# Patient Record
Sex: FEMALE | Race: WHITE | Employment: OTHER | URBAN - METROPOLITAN AREA
[De-identification: names, ages, dates, MRNs, and addresses within clinical notes are randomized per-mention and may not be internally consistent; named-entity substitution may affect disease eponyms.]

---

## 2017-02-28 ENCOUNTER — ANESTHESIA (OUTPATIENT)
Dept: ENDOSCOPY | Age: 72
End: 2017-02-28
Payer: MEDICARE

## 2017-02-28 ENCOUNTER — SURGERY (OUTPATIENT)
Age: 72
End: 2017-02-28

## 2017-02-28 ENCOUNTER — HOSPITAL ENCOUNTER (OUTPATIENT)
Age: 72
Setting detail: OUTPATIENT SURGERY
Discharge: HOME | End: 2017-02-28
Attending: INTERNAL MEDICINE | Admitting: INTERNAL MEDICINE
Payer: MEDICARE

## 2017-02-28 ENCOUNTER — APPOINTMENT (OUTPATIENT)
Dept: ULTRASOUND IMAGING | Age: 72
End: 2017-02-28
Attending: INTERNAL MEDICINE
Payer: MEDICARE

## 2017-02-28 ENCOUNTER — ANESTHESIA EVENT (OUTPATIENT)
Dept: ENDOSCOPY | Age: 72
End: 2017-02-28
Payer: MEDICARE

## 2017-02-28 VITALS
OXYGEN SATURATION: 99 % | WEIGHT: 184.56 LBS | BODY MASS INDEX: 28.97 KG/M2 | TEMPERATURE: 97.7 F | SYSTOLIC BLOOD PRESSURE: 106 MMHG | DIASTOLIC BLOOD PRESSURE: 57 MMHG | RESPIRATION RATE: 13 BRPM | HEART RATE: 58 BPM | HEIGHT: 67 IN

## 2017-02-28 PROCEDURE — 76060000031 HC ANESTHESIA FIRST 0.5 HR: Performed by: INTERNAL MEDICINE

## 2017-02-28 PROCEDURE — 74011000250 HC RX REV CODE- 250

## 2017-02-28 PROCEDURE — 74011250636 HC RX REV CODE- 250/636: Performed by: INTERNAL MEDICINE

## 2017-02-28 PROCEDURE — 76040000019: Performed by: INTERNAL MEDICINE

## 2017-02-28 PROCEDURE — 74011250636 HC RX REV CODE- 250/636

## 2017-02-28 RX ORDER — SODIUM CHLORIDE 9 MG/ML
INJECTION, SOLUTION INTRAVENOUS
Status: DISCONTINUED | OUTPATIENT
Start: 2017-02-28 | End: 2017-02-28 | Stop reason: HOSPADM

## 2017-02-28 RX ORDER — MIDAZOLAM HYDROCHLORIDE 1 MG/ML
.25-1 INJECTION, SOLUTION INTRAMUSCULAR; INTRAVENOUS
Status: DISCONTINUED | OUTPATIENT
Start: 2017-02-28 | End: 2017-02-28 | Stop reason: HOSPADM

## 2017-02-28 RX ORDER — LIDOCAINE HYDROCHLORIDE 20 MG/ML
INJECTION, SOLUTION EPIDURAL; INFILTRATION; INTRACAUDAL; PERINEURAL AS NEEDED
Status: DISCONTINUED | OUTPATIENT
Start: 2017-02-28 | End: 2017-02-28 | Stop reason: HOSPADM

## 2017-02-28 RX ORDER — ATROPINE SULFATE 0.1 MG/ML
0.5 INJECTION INTRAVENOUS
Status: DISCONTINUED | OUTPATIENT
Start: 2017-02-28 | End: 2017-02-28 | Stop reason: HOSPADM

## 2017-02-28 RX ORDER — DEXTROMETHORPHAN/PSEUDOEPHED 2.5-7.5/.8
1.2 DROPS ORAL
Status: DISCONTINUED | OUTPATIENT
Start: 2017-02-28 | End: 2017-02-28 | Stop reason: HOSPADM

## 2017-02-28 RX ORDER — HYOSCYAMINE SULFATE 0.125 MG
125 TABLET ORAL
Qty: 30 TAB | Refills: 1 | Status: SHIPPED | OUTPATIENT
Start: 2017-02-28

## 2017-02-28 RX ORDER — EPINEPHRINE 0.1 MG/ML
1 INJECTION INTRACARDIAC; INTRAVENOUS
Status: DISCONTINUED | OUTPATIENT
Start: 2017-02-28 | End: 2017-02-28 | Stop reason: HOSPADM

## 2017-02-28 RX ORDER — FENTANYL CITRATE 50 UG/ML
100 INJECTION, SOLUTION INTRAMUSCULAR; INTRAVENOUS
Status: DISCONTINUED | OUTPATIENT
Start: 2017-02-28 | End: 2017-02-28 | Stop reason: HOSPADM

## 2017-02-28 RX ORDER — NALOXONE HYDROCHLORIDE 0.4 MG/ML
0.4 INJECTION, SOLUTION INTRAMUSCULAR; INTRAVENOUS; SUBCUTANEOUS
Status: DISCONTINUED | OUTPATIENT
Start: 2017-02-28 | End: 2017-02-28 | Stop reason: HOSPADM

## 2017-02-28 RX ORDER — SODIUM CHLORIDE 0.9 % (FLUSH) 0.9 %
5-10 SYRINGE (ML) INJECTION EVERY 8 HOURS
Status: DISCONTINUED | OUTPATIENT
Start: 2017-02-28 | End: 2017-02-28 | Stop reason: HOSPADM

## 2017-02-28 RX ORDER — DIPHENHYDRAMINE HYDROCHLORIDE 50 MG/ML
50 INJECTION, SOLUTION INTRAMUSCULAR; INTRAVENOUS ONCE
Status: DISCONTINUED | OUTPATIENT
Start: 2017-02-28 | End: 2017-02-28 | Stop reason: HOSPADM

## 2017-02-28 RX ORDER — SODIUM CHLORIDE 0.9 % (FLUSH) 0.9 %
5-10 SYRINGE (ML) INJECTION AS NEEDED
Status: DISCONTINUED | OUTPATIENT
Start: 2017-02-28 | End: 2017-02-28 | Stop reason: HOSPADM

## 2017-02-28 RX ORDER — PROPOFOL 10 MG/ML
INJECTION, EMULSION INTRAVENOUS AS NEEDED
Status: DISCONTINUED | OUTPATIENT
Start: 2017-02-28 | End: 2017-02-28 | Stop reason: HOSPADM

## 2017-02-28 RX ORDER — FLUMAZENIL 0.1 MG/ML
0.2 INJECTION INTRAVENOUS
Status: DISCONTINUED | OUTPATIENT
Start: 2017-02-28 | End: 2017-02-28 | Stop reason: HOSPADM

## 2017-02-28 RX ORDER — SODIUM CHLORIDE 9 MG/ML
100 INJECTION, SOLUTION INTRAVENOUS CONTINUOUS
Status: DISCONTINUED | OUTPATIENT
Start: 2017-02-28 | End: 2017-02-28 | Stop reason: HOSPADM

## 2017-02-28 RX ORDER — ERGOCALCIFEROL 1.25 MG/1
50000 CAPSULE ORAL
COMMUNITY

## 2017-02-28 RX ADMIN — PROPOFOL 50 MG: 10 INJECTION, EMULSION INTRAVENOUS at 14:51

## 2017-02-28 RX ADMIN — PROPOFOL 50 MG: 10 INJECTION, EMULSION INTRAVENOUS at 14:43

## 2017-02-28 RX ADMIN — LIDOCAINE HYDROCHLORIDE 60 MG: 20 INJECTION, SOLUTION EPIDURAL; INFILTRATION; INTRACAUDAL; PERINEURAL at 14:42

## 2017-02-28 RX ADMIN — PROPOFOL 50 MG: 10 INJECTION, EMULSION INTRAVENOUS at 14:46

## 2017-02-28 RX ADMIN — PROPOFOL 50 MG: 10 INJECTION, EMULSION INTRAVENOUS at 14:55

## 2017-02-28 RX ADMIN — SODIUM CHLORIDE: 9 INJECTION, SOLUTION INTRAVENOUS at 14:36

## 2017-02-28 RX ADMIN — PROPOFOL 50 MG: 10 INJECTION, EMULSION INTRAVENOUS at 15:03

## 2017-02-28 RX ADMIN — PROPOFOL 50 MG: 10 INJECTION, EMULSION INTRAVENOUS at 15:07

## 2017-02-28 RX ADMIN — SODIUM CHLORIDE 100 ML/HR: 900 INJECTION, SOLUTION INTRAVENOUS at 14:04

## 2017-02-28 RX ADMIN — PROPOFOL 50 MG: 10 INJECTION, EMULSION INTRAVENOUS at 14:48

## 2017-02-28 RX ADMIN — PROPOFOL 50 MG: 10 INJECTION, EMULSION INTRAVENOUS at 14:42

## 2017-02-28 RX ADMIN — PROPOFOL 50 MG: 10 INJECTION, EMULSION INTRAVENOUS at 14:59

## 2017-02-28 NOTE — H&P
[address]  [address] [facility]       History and Physical       NAME:  Colby Lopez   :   1945   MRN:   [id number]             History of Present Illness:  Patient is a 73 y.o. who is seen for suspected mass vs fatty infiltration of the pancreas. As part of her work up for RUQ pain, her bile duct needs to be cleared of biliary sludge that may not have been seen on CT and MRI. PMH:  Past Medical History:   Diagnosis Date    Autoimmune disease ([address]     Lupus    GERD (gastroesophageal reflux disease)     Ill-defined condition     meniers diease    Sleep apnea     Stroke ([facility] [address]     3 eye strokes    Thyroid disease        PSH:  Past Surgical History:   Procedure Laterality Date    HX ORTHOPAEDIC      spinal fusion        Allergies: Allergies   Allergen Reactions    Adhesive Other (comments)     Will remove skin    Zithromax [Azithromycin] Nausea and Vomiting       Home Medications:  Prior to Admission Medications   Prescriptions Last Dose Informant Patient Reported? Taking? CLOBETASOL PROPIONATE, BULK, 2017 at Unknown time  Yes Yes   Sig: by Does Not Apply route. ointment 0.05%    Omega-3-DHA-EPA-Fish Oil 1,000 (120-180) mg Cap 2017 at Unknown time  Yes Yes   Sig: Take  by mouth. SUMAtriptan (IMITREX) 50 mg tablet 2017 at Unknown time  Yes Yes   Sig: Take 50 mg by mouth once as needed. aspirin 81 mg tablet 2017  Yes No   Sig: Take 81 mg by mouth. buPROPion SR (WELLBUTRIN SR) 150 mg SR tablet 2017 at Unknown time  Yes Yes   Sig: Take  by mouth two (2) times a day. cetirizine (ZYRTEC) 10 mg tablet Not Taking at Unknown time  Yes No   Sig: Take  by mouth daily. cyanocobalamin (VITAMIN B-12) 1,000 mcg tablet Not Taking at Unknown time  Yes No   Sig: Take 1,000 mcg by mouth daily. diazepam (VALIUM) 5 mg tablet Unknown at Unknown time  Yes No   Sig: Take 5 mg by mouth every six (6) hours as needed. dicyclomine (BENTYL) 10 mg capsule 2/21/2017 at Unknown time  Yes Yes   Sig: Take 10 mg by mouth 4 times daily (before meals and nightly). ergocalciferol (VITAMIN D2) 50,000 unit capsule 2/22/2017  Yes Yes   Sig: Take 50,000 Units by mouth every seven (7) days. Indications: PREVENTION OF VITAMIN D DEFICIENCY   fexofenadine (ALLEGRA) 180 mg tablet Not Taking at Unknown time  Yes No   Sig: Take  by mouth daily. hydroxychloroquine (PLAQUENIL) 200 mg tablet 2/27/2017 at Unknown time  Yes Yes   Sig: Take 200 mg by mouth daily. levothyroxine (LEVOTHROID) 88 mcg tablet 2/27/2017 at Unknown time  Yes Yes   Sig: Take  by mouth Daily (before breakfast). pantoprazole (PROTONIX) 40 mg tablet Not Taking at Unknown time  Yes No   Sig: Take 40 mg by mouth daily. predniSONE (DELTASONE) 20 mg tablet Not Taking at Unknown time  Yes No   Sig: Take 20 mg by mouth daily. Two tabs daily    promethazine (PHENERGAN) 25 mg tablet Not Taking at Unknown time  Yes No   Sig: Take 25 mg by mouth every six (6) hours as needed. ranitidine (ZANTAC) 150 mg tablet 2/27/2017 at Unknown time  Yes Yes   Sig: Take 150 mg by mouth two (2) times a day. sertraline (ZOLOFT) 50 mg tablet 2/27/2017 at Unknown time  Yes Yes   Sig: Take  by mouth daily. triamterene-hydrochlorothiazide (MAXZIDE) 37.5-25 mg per tablet 2/27/2017 at Unknown time  Yes Yes   Sig: Take  by mouth daily. vit B Cmplx 3-FA-Vit C-Biotin (NEPHRO ADONIS RX) 1- mg-mg-mcg tablet Not Taking at Unknown time  Yes No   Sig: Take 1 Tab by mouth daily.         Facility-Administered Medications: None       Hospital Medications:  Current Facility-Administered Medications   Medication Dose Route Frequency    0.9% sodium chloride infusion  100 mL/hr IntraVENous CONTINUOUS    sodium chloride (NS) flush 5-10 mL  5-10 mL IntraVENous Q8H    sodium chloride (NS) flush 5-10 mL  5-10 mL IntraVENous PRN    midazolam (VERSED) injection 0.25-10 mg  0.25-10 mg IntraVENous Multiple    fentaNYL citrate (PF) injection 100 mcg  100 mcg IntraVENous Multiple    naloxone (NARCAN) injection 0.4 mg  0.4 mg IntraVENous Multiple    flumazenil (ROMAZICON) 0.1 mg/mL injection 0.2 mg  0.2 mg IntraVENous Multiple    simethicone (MYLICON) 98ZO/3.8IJ oral drops 80 mg  1.2 mL Oral Multiple    diphenhydrAMINE (BENADRYL) injection 50 mg  50 mg IntraVENous ONCE    atropine injection 0.5 mg  0.5 mg IntraVENous ONCE PRN    EPINEPHrine (ADRENALIN) 0.1 mg/mL syringe 1 mg  1 mg Endoscopically ONCE PRN       Social History:  Social History   Substance Use Topics    Smoking status: Former Smoker     Types: Cigarettes     Quit date: 9/13/1984    Smokeless tobacco: Never Used    Alcohol use Not on file       Family History:  Family History   Problem Relation Age of Onset    Coronary Artery Disease Mother              Review of Systems:      Constitutional: negative fever, negative chills, negative weight loss  Eyes:   negative visual changes  ENT:   negative sore throat, tongue or lip swelling  Respiratory:  negative cough, negative dyspnea  Cards:  negative for chest pain, palpitations, lower extremity edema  GI:   See HPI  :  negative for frequency, dysuria  Integument:  negative for rash and pruritus  Heme:  negative for easy bruising and gum/nose bleeding  Musculoskel: negative for myalgias,  back pain and muscle weakness  Neuro: negative for headaches, dizziness, vertigo  Psych:  negative for feelings of anxiety, depression       Objective:   Patient Vitals for the past 8 hrs:   BP Temp Pulse Resp SpO2 Height Weight   02/28/17 1345 - - - - - 5' 6.5\" (1.689 m) 83.7 kg (184 lb 9 oz)   02/28/17 1344 117/69 98.4 °F (36.9 °C) 60 20 100 % - -             EXAM:     NEURO-a&o   HEENT-wnl   LUNGS-clear    COR-regular rate and rhythym     ABD-soft , no tenderness, no rebound, good bs     EXT-no edema     Data Review     No results for input(s): WBC, HGB, HCT, PLT, HGBEXT, HCTEXT, PLTEXT in the last 72 hours. No results for input(s): NA, K, CL, CO2, BUN, CREA, GLU, PHOS, CA in the last 72 hours. No results for input(s): SGOT, GPT, AP, TBIL, TP, ALB, GLOB, GGT, AML, LPSE in the last 72 hours. No lab exists for component: AMYP, HLPSE  No results for input(s): INR, PTP, APTT in the last 72 hours. No lab exists for component: INREXT       Assessment:   · Abnormal pancreas imaging  · RUQ pain     Patient Active Problem List   Diagnosis Code    Chest pain, unspecified R07.9     Plan:   · Endoscopic procedure with MAC     Signed By: Shimon Sandhu.  Marce Quijano MD     2/28/2017  2:24 PM

## 2017-02-28 NOTE — DISCHARGE INSTRUCTIONS
[address]   [place]  [place] [address], [address] [facility]    Endoscopic ultrasound DISCHARGE INSTRUCTIONS    Hamlet Gray  [id number]  1945    Discomfort:  Sore throat-  warm salt water gargle  redness at IV site- apply warm compress to area; if redness or soreness persist- contact your physician  Gaseous discomfort- walking, belching will help relieve any discomfort  You may not operate a vehicle for 12 hours  You may not engage in an occupation involving machinery or appliances for rest of today  You may not drink alcoholic beverages for at least 12 hours  Avoid making any critical decisions for at least 24 hour  DIET  You may eat and drink now and after you leave. You may resume your regular diet - however -  remember your colon is empty and a heavy meal will produce gas. Avoid these foods:  vegetables, fried / greasy foods, carbonated drinks    ACTIVITY  You may resume your normal daily activities   Spend the remainder of the day resting -  avoid any strenuous activity. CALL M.D. ANY SIGN OF   Increasing pain, nausea, vomiting  Abdominal distension (swelling)  New increased bleeding (oral or rectal)  Fever (chills)  Bloody discharge from nose or mouth  Shortness of breath or chest pain call 911 then call Dr Gely Sidhu    Follow-up Instructions:   Call Dr. Sedalia Barthel for any questions or problems. Telephone # [phone]    ENDOSCOPY FINDINGS:   Your endoscopic ultrasound showed fat infiltration into the pancreas. A mass was not seen on today's exam. A repeat MRI of the pancreas should be performed in 6 months. Your gallbladder and bile duct were normal appearing. A prescription for hyoscyamine has been provided. Please try this in place of dicyclomine. Signed By: Misty Heller.  Gely Sidhu MD     2/28/2017  3:38 PM

## 2017-02-28 NOTE — ANESTHESIA POSTPROCEDURE EVALUATION
Post-Anesthesia Evaluation and Assessment    Patient: Du Singh MRN: [id number]  SSN: [id number]    YOB: 1945  Age: 73 y.o. Sex: female       Cardiovascular Function/Vital Signs  Visit Vitals    /69    Pulse 64    Temp 36.5 °C (97.7 °F)    Resp 17    Ht 5' 6.5\" (1.689 m)    Wt 83.7 kg (184 lb 9 oz)    SpO2 100%    BMI 29.34 kg/m2       Patient is status post MAC anesthesia for Procedure(s):  ESOPHAGOGASTRODUODENOSCOPY (EGD), LINEAR EUS   ENDOSCOPIC ULTRASOUND (EUS). Nausea/Vomiting: None    Postoperative hydration reviewed and adequate. Pain:  Pain Scale 1: Numeric (0 - 10) (02/28/17 1533)  Pain Intensity 1: 0 (02/28/17 1533)   Managed    Neurological Status: At baseline    Mental Status and Level of Consciousness: Arousable    Pulmonary Status:   O2 Device: Room air (02/28/17 1533)   Adequate oxygenation and airway patent    Complications related to anesthesia: None    Post-anesthesia assessment completed.  No concerns    Signed By: Chris Armstrong MD     February 28, 2017

## 2017-02-28 NOTE — IP AVS SNAPSHOT
[address] 
[phone] Patient: Raquel Ragland 
MRN: [id number] :1945 You are allergic to the following Allergen Reactions Adhesive Other (comments) Will remove skin Zithromax (Azithromycin) Nausea and Vomiting Recent Documentation Height  
  
  
  
  
  
 1.689 m Emergency Contacts Name Discharge Info Relation Home Work Mobile Elias Hanna DISCHARGE CAREGIVER [3] Spouse [3] [phone] About your hospitalization You were admitted on:  2017 You last received care in the:  [address] [place] [address] ENDOSCOPY You were discharged on:  2017 Unit phone number:  [phone] Why you were hospitalized Your primary diagnosis was:  Not on File Providers Seen During Your Hospitalizations Provider Role Specialty Primary office phone Sanya Mcguire MD Attending Provider Gastroenterology [phone] Your Primary Care Physician (PCP) Primary Care Physician Office Phone Office Fax Silviano Ocasio [phone] Follow-up Information None Current Discharge Medication List  
  
START taking these medications Dose & Instructions Dispensing Information Comments Morning Noon Evening Bedtime  
 hyoscyamine 0.125 mg tablet Commonly known as:  Robley Co Your next dose is: Today, Tomorrow Other:  _________ Dose:  125 mcg Take 1 Tab by mouth three (3) times daily as needed for Cramping. Quantity:  30 Tab Refills:  1 CONTINUE these medications which have NOT CHANGED Dose & Instructions Dispensing Information Comments Morning Noon Evening Bedtime ALLEGRA 180 mg tablet Generic drug:  fexofenadine Your next dose is: Today, Tomorrow Other:  _________ Take  by mouth daily. Refills:  0 aspirin 81 mg tablet Your next dose is: Today, Tomorrow Other:  _________ Dose:  81 mg Take 81 mg by mouth. Refills:  0  
     
   
   
   
  
 CLOBETASOL PROPIONATE (BULK) Your next dose is: Today, Tomorrow Other:  _________  
   
   
 by Does Not Apply route. ointment 0.05% Refills:  0 IMITREX 50 mg tablet Generic drug:  SUMAtriptan Your next dose is: Today, Tomorrow Other:  _________ Dose:  50 mg Take 50 mg by mouth once as needed. Refills:  0 LEVOTHROID 88 mcg tablet Generic drug:  levothyroxine Your next dose is: Today, Tomorrow Other:  _________ Take  by mouth Daily (before breakfast). Refills:  0 Omega-3-DHA-EPA-Fish Oil 1,000 mg (120 mg-180 mg) Cap Your next dose is: Today, Tomorrow Other:  _________ Take  by mouth. Refills:  0  
     
   
   
   
  
 PLAQUENIL 200 mg tablet Generic drug:  hydroxychloroquine Your next dose is: Today, Tomorrow Other:  _________ Dose:  200 mg Take 200 mg by mouth daily. Refills:  0  
     
   
   
   
  
 predniSONE 20 mg tablet Commonly known as:  Merle Tong Your next dose is: Today, Tomorrow Other:  _________ Dose:  20 mg Take 20 mg by mouth daily. Two tabs daily Refills:  0  
     
   
   
   
  
 promethazine 25 mg tablet Commonly known as:  PHENERGAN Your next dose is: Today, Tomorrow Other:  _________ Dose:  25 mg Take 25 mg by mouth every six (6) hours as needed. Refills:  0 PROTONIX 40 mg tablet Generic drug:  pantoprazole Your next dose is: Today, Tomorrow Other:  _________ Dose:  40 mg Take 40 mg by mouth daily. Refills:  0  
     
   
   
   
  
 raNITIdine 150 mg tablet Commonly known as:  ZANTAC Your next dose is: Today, Tomorrow Other:  _________ Dose:  150 mg Take 150 mg by mouth two (2) times a day. Refills:  0  
     
   
   
   
  
 triamterene-hydroCHLOROthiazide 37.5-25 mg per tablet Commonly known as:  Prema Rocha Your next dose is: Today, Tomorrow Other:  _________ Take  by mouth daily. Refills:  0 VALIUM 5 mg tablet Generic drug:  diazePAM  
   
Your next dose is: Today, Tomorrow Other:  _________ Dose:  5 mg Take 5 mg by mouth every six (6) hours as needed. Refills:  0  
     
   
   
   
  
 vit B Cmplx 3-FA-Vit C-Biotin 1- mg-mg-mcg tablet Commonly known as:  NEPHRO ADONIS RX Your next dose is: Today, Tomorrow Other:  _________ Dose:  1 Tab Take 1 Tab by mouth daily. Refills:  0  
     
   
   
   
  
 VITAMIN B-12 1,000 mcg tablet Generic drug:  cyanocobalamin Your next dose is: Today, Tomorrow Other:  _________ Dose:  1000 mcg Take 1,000 mcg by mouth daily. Refills:  0  
     
   
   
   
  
 VITAMIN D2 50,000 unit capsule Generic drug:  ergocalciferol Your next dose is: Today, Tomorrow Other:  _________ Dose:  05351 Units Take 50,000 Units by mouth every seven (7) days. Indications: PREVENTION OF VITAMIN D DEFICIENCY Refills:  0 WELLBUTRIN  mg SR tablet Generic drug:  buPROPion SR Your next dose is: Today, Tomorrow Other:  _________ Take  by mouth two (2) times a day. Refills:  0  
     
   
   
   
  
 ZOLOFT 50 mg tablet Generic drug:  sertraline Your next dose is: Today, Tomorrow Other:  _________ Take  by mouth daily. Refills:  0 ZyrTEC 10 mg tablet Generic drug:  cetirizine Your next dose is: Today, Tomorrow Other:  _________ Take  by mouth daily. Refills:  0 STOP taking these medications BENTYL 10 mg capsule Generic drug:  dicyclomine Where to Get Your Medications Information on where to get these meds will be given to you by the nurse or doctor. ! Ask your nurse or doctor about these medications  
  hyoscyamine 0.125 mg tablet Discharge Instructions [address] 
[address] [facility], [address] Endoscopic ultrasound DISCHARGE INSTRUCTIONS Odilia Rudolph 
[id number] 1945 Discomfort: 
Sore throat-  warm salt water gargle 
redness at IV site- apply warm compress to area; if redness or soreness persist- contact your physician Gaseous discomfort- walking, belching will help relieve any discomfort You may not operate a vehicle for 12 hours You may not engage in an occupation involving machinery or appliances for rest of today You may not drink alcoholic beverages for at least 12 hours Avoid making any critical decisions for at least 24 hour DIET You may eat and drink now and after you leave. You may resume your regular diet  however -  remember your colon is empty and a heavy meal will produce gas. Avoid these foods:  vegetables, fried / greasy foods, carbonated drinks ACTIVITY You may resume your normal daily activities Spend the remainder of the day resting -  avoid any strenuous activity. CALL M.D. ANY SIGN OF Increasing pain, nausea, vomiting Abdominal distension (swelling) New increased bleeding (oral or rectal) Fever (chills) Bloody discharge from nose or mouth Shortness of breath or chest pain call [phone] then call Dr Nenita Pan Follow-up Instructions: 
 Call Dr. Chicho Hernández for any questions or problems. Telephone # [phone] ENDOSCOPY FINDINGS: 
 Your endoscopic ultrasound showed fat infiltration into the pancreas.  A mass was not seen on today's exam. A repeat MRI of the pancreas should be performed in 6 months. Your gallbladder and bile duct were normal appearing. A prescription for hyoscyamine has been provided. Please try this in place of dicyclomine. Signed By: Misty Heller. Gely Sidhu MD   
 2/28/2017  3:38 PM 
  
 
 
 
 
Discharge Orders None Introducing [facility] SERVICES! Debra Abarca introduces [employer] patient portal. Now you can access parts of your medical record, email your doctor's office, and request medication refills online. 1. In your internet browser, go to [email or web address] 2. Click on the First Time User? Click Here link in the Sign In box. You will see the New Member Sign Up page. 3. Enter your [employer] Access Code exactly as it appears below. You will not need to use this code after youve completed the sign-up process. If you do not sign up before the expiration date, you must request a new code. · [employer] Access Code: [email or web address] Expires: 5/29/2017 12:36 PM 
 
4. Enter the last four digits of your Social Security Number (xxxx) and Date of Birth (mm/dd/yyyy) as indicated and click Submit. You will be taken to the next sign-up page. 5. Create a [employer] ID. This will be your [employer] login ID and cannot be changed, so think of one that is secure and easy to remember. 6. Create a [employer] password. You can change your password at any time. 7. Enter your Password Reset Question and Answer. This can be used at a later time if you forget your password. 8. Enter your e-mail address. You will receive e-mail notification when new information is available in [address] 9. Click Sign Up. You can now view and download portions of your medical record. 10. Click the Download Summary menu link to download a portable copy of your medical information.  
 
If you have questions, please visit the Frequently Asked Questions section of the tritrue. Remember, MyChart is NOT to be used for urgent needs. For medical emergencies, dial 911. Now available from your iPhone and Android! General Information Please provide this summary of care documentation to your next provider. Patient Signature:  ____________________________________________________________ Date:  ____________________________________________________________  
  
Faviola Pendleton Provider Signature:  ____________________________________________________________ Date:  ____________________________________________________________

## 2017-02-28 NOTE — PROCEDURES
[address] [facility]         Endoscopic Ultrasound    NAME:  Vanessa Crain   :   1945   MRN:   [id number]       Procedure Type: Endoscopic Ultrasound    Indications: suspected pancreatic mass vs fatty pancreas, RUQ pain    Pre-operative Diagnosis: see indication above    Post-operative Diagnosis:  See findings below    : Lior Henry. Gunner Rudolph MD    Referring Provider: -WILMAR LENZ MD,-Yasmin Cherry Co, DO    Anethesia/Sedation:  MAC anesthesia Propofol      Procedure Details     After informed consent was obtained for the procedure, with all risks and benefits of procedure explained the patient was taken to the endoscopy suite and placed in the left lateral decubitus position. Following sequential administration of sedation as per above, an EGD was performed. Findings are listed below. Next, the linear echoendoscope was inserted into the mouth and advanced under direct vision to the second portion of the duodenum. Findings:     Endoscopic:   Esophagus:normal   Stomach: normal    Duodenum: normal to second portion. Normal appearing ampulla on tangential view      Ultrasound:   Esophagus: normal   Stomach: normal   Pancreas:     Areas examined: the entire gland    Parenchyma: The pancreatic parenchyma did not contain a mass or cystic lesion. There were relatively hyperechoic regions geographically that were favored to be fat infiltration. Pancreatic Duct: non-dilated, smooth contour   Liver:     Parenchyma: visualize portions were normal appearing   Gallbladder: no sludge or stones   Bile Duct: approximately 5 mm and no evidence of stones or sludge   Lymph Node: no pathological lymphadenopathy         Specimen Removed:  None    Complications: None. EBL:  None. Interventions: see above    Impression:  1. EUS-findings consistent with hyperechoic regions of pancreas parenchyma most consistent with fat infiltration  2.  A distinct pancreatic mass or cystic lesion was not seen  3. Normal appearing bile duct  4. Normal appearing gallbladder    Recommendations:   1. Trial of hyoscyamine - script provided  2. Since a mass of pancreas was seen on CT, would consider repeat CT/MRI of pancreas in 6 months or sooner if needed  3. Could consider NM gastric emptying study since her symptoms include nausea/vomiting, her EGD was normal, and gallbladder appears to be unremarkable on multiple studies. Signed By: Merlinda Och. Bartholomew Hasten, MD     2/28/2017  4:49 PM

## 2017-02-28 NOTE — PROGRESS NOTES
Bedside and Verbal shift change report given to fiore,rn (oncoming nurse) by Nayan Zuniga (offgoing nurse). Report included the following information SBAR.

## 2017-02-28 NOTE — ROUTINE PROCESS
Vandana Almonte  1945  [id number]    Situation:  Verbal report received from: Irasema Nj RN  Procedure: Procedure(s):  ESOPHAGOGASTRODUODENOSCOPY (EGD), LINEAR EUS   ENDOSCOPIC ULTRASOUND (EUS)    Background:    Preoperative diagnosis: ABNORMAL PANCREAS IMAGING, RIGHT UPPER QUADRANT PAIN  Postoperative diagnosis: 1.- Normal EGD   2.- Right Upper Quadrant Pain  3.- Abnormal Pancreas Imaging    :  Dr. Phyllis Jordan  Assistant(s): Endoscopy Technician-1: Miladys Guerrero  Endoscopy RN-1: Teddy Jackson RN    Specimens: * No specimens in log *  H. Pylori  no    Assessment:  Intra-procedure medications   Anesthesia gave intra-procedure sedation and medications, see anesthesia flow sheet yes    Intravenous fluids: NS@ KVO     Vital signs stable     Abdominal assessment: round and soft     Recommendation:  Discharge patient per MD order.   Family or Friend   Permission to share finding with family or friend yes

## 2017-02-28 NOTE — ANESTHESIA PREPROCEDURE EVALUATION
Anesthetic History   No history of anesthetic complications            Review of Systems / Medical History  Patient summary reviewed, nursing notes reviewed and pertinent labs reviewed    Pulmonary        Sleep apnea: CPAP           Neuro/Psych         TIA     Cardiovascular  Within defined limits                Exercise tolerance: >4 METS     GI/Hepatic/Renal     GERD: well controlled           Endo/Other      Hypothyroidism       Other Findings   Comments: SLE           Physical Exam    Airway  Mallampati: II  TM Distance: > 6 cm  Neck ROM: normal range of motion   Mouth opening: Normal     Cardiovascular  Regular rate and rhythm,  S1 and S2 normal,  no murmur, click, rub, or gallop             Dental  No notable dental hx       Pulmonary  Breath sounds clear to auscultation               Abdominal  GI exam deferred       Other Findings            Anesthetic Plan    ASA: 2  Anesthesia type: MAC          Induction: Intravenous  Anesthetic plan and risks discussed with: Patient

## 2023-09-08 ENCOUNTER — OFFICE VISIT (OUTPATIENT)
Facility: LOCATION | Age: 78
End: 2023-09-08
Payer: MEDICARE

## 2023-09-08 DIAGNOSIS — D31.31 BENIGN NEOPLASM OF RIGHT CHOROID: Primary | ICD-10-CM

## 2023-09-08 DIAGNOSIS — H04.123 DRY EYE SYNDROME BIL LACRIML GLANDS: ICD-10-CM

## 2023-09-08 DIAGNOSIS — H52.13 MYOPIA, BILATERAL: ICD-10-CM

## 2023-09-08 PROCEDURE — 92014 COMPRE OPH EXAM EST PT 1/>: CPT | Performed by: OPHTHALMOLOGY

## 2023-09-08 ASSESSMENT — VISUAL ACUITY
OD: 20/20
OS: 20/20

## 2023-09-08 ASSESSMENT — INTRAOCULAR PRESSURE
OD: 13
OS: 12

## (undated) DEVICE — SET EXTN TBNG L BOR 4 W STPCOCK ST 32IN PRIMING VOL 6ML

## (undated) DEVICE — KIT COMPLIANCE W ENDOGLDE + 11 NO BRSH ENDOKT

## (undated) DEVICE — SYRINGE MED 20ML STD CLR PLAS LUERLOCK TIP N CTRL DISP

## (undated) DEVICE — WRISTBAND ID AD W1XL11.5IN RED POLY ALRG PREPRINTED PERM

## (undated) DEVICE — NEONATAL-ADULT SPO2 SENSOR: Brand: NELLCOR

## (undated) DEVICE — CATH IV AUTOGRD BC BLU 22GA 25 -- INSYTE

## (undated) DEVICE — Device: Brand: SINGLE USE SOFT BRUSH

## (undated) DEVICE — BW-400L DISP SNGL-END CLEANINGBRUSH: Brand: OLYMPUS

## (undated) DEVICE — BLOCK BITE ENDO --

## (undated) DEVICE — 1200 GUARD II KIT W/5MM TUBE W/O VAC TUBE: Brand: GUARDIAN

## (undated) DEVICE — KENDALL RADIOLUCENT FOAM MONITORING ELECTRODE -RECTANGULAR SHAPE: Brand: KENDALL

## (undated) DEVICE — Device

## (undated) DEVICE — KIT IV STRT W CHLORAPREP PD 1ML

## (undated) DEVICE — BW-412T DISP COMBO CLEANING BRUSH: Brand: SINGLE USE COMBINATION CLEANING BRUSH

## (undated) DEVICE — NEEDLE HYPO 18GA L1.5IN PNK S STL HUB POLYPR SHLD REG BVL

## (undated) DEVICE — CANN NASAL O2 CAPNOGRAPHY AD -- FILTERLINE

## (undated) DEVICE — ENDO CARRY-ON PROCEDURE KIT INCLUDES ENZYMATIC SPONGE, GAUZE, BIOHAZARD LABEL, TRAY, LUBRICANT, DIRTY SCOPE LABEL, WATER LABEL, TRAY, DRAWSTRING PAD, AND DEFENDO 4-PIECE KIT.: Brand: ENDO CARRY-ON PROCEDURE KIT

## (undated) DEVICE — SET ADMIN 16ML TBNG L100IN 2 Y INJ SITE IV PIGGY BK DISP

## (undated) DEVICE — SOLIDIFIER FLUID 3000 CC ABSORB

## (undated) DEVICE — BAG BELONG PT PERS CLEAR HANDL